# Patient Record
Sex: MALE | Race: WHITE | NOT HISPANIC OR LATINO | Employment: FULL TIME | URBAN - METROPOLITAN AREA
[De-identification: names, ages, dates, MRNs, and addresses within clinical notes are randomized per-mention and may not be internally consistent; named-entity substitution may affect disease eponyms.]

---

## 2020-03-05 ENCOUNTER — HOSPITAL ENCOUNTER (EMERGENCY)
Facility: HOSPITAL | Age: 40
Discharge: ANOTHER HOSPITAL | End: 2020-03-05
Attending: EMERGENCY MEDICINE

## 2020-03-05 VITALS
SYSTOLIC BLOOD PRESSURE: 152 MMHG | RESPIRATION RATE: 20 BRPM | HEART RATE: 75 BPM | OXYGEN SATURATION: 100 % | TEMPERATURE: 98 F | WEIGHT: 200.63 LBS | DIASTOLIC BLOOD PRESSURE: 86 MMHG

## 2020-03-05 DIAGNOSIS — L03.213 PERIORBITAL CELLULITIS OF LEFT EYE: Primary | ICD-10-CM

## 2020-03-05 DIAGNOSIS — H00.015 HORDEOLUM EXTERNUM LEFT LOWER EYELID: ICD-10-CM

## 2020-03-05 PROCEDURE — 99285 EMERGENCY DEPT VISIT HI MDM: CPT | Mod: ER

## 2020-03-05 RX ORDER — SULFAMETHOXAZOLE AND TRIMETHOPRIM 800; 160 MG/1; MG/1
1 TABLET ORAL
COMMUNITY

## 2020-03-05 RX ORDER — CEFADROXIL 500 MG/1
1 CAPSULE ORAL EVERY 12 HOURS
COMMUNITY

## 2020-03-05 NOTE — ED NOTES
Pt accepted by Dr. Patrick Martinez in the [facility].  Report called to Adrian BROWN at [facility] [address] in the ER.  Pt requesting to go private vehicle and declines ambulance transport.

## 2020-03-05 NOTE — ED PROVIDER NOTES
Encounter Date: 3/5/2020       History     Chief Complaint   Patient presents with    Eye Problem     redness/swelling. has seen 3 differents MDs, including opthamologist and states that he can't wait until next week for surgery. taking Bactrim and Cefadroxil     38 y/o M with no significant past medical history presents the emergency department with complaints of moderate, worsening, constant left lower eyelid swelling. This is been worsening over the past 1 week, and he did see an ophthalmologist yesterday and was diagnosed with a sebaceous cyst.  Patient says there is moderate constant pain that has improved with ibuprofen this morning.  Patient is currently taking Bactrim and a cephalosporin for this, and is scheduled for a procedure for removal next week.  Patient states that overnight the swelling did increase and started draining a yellow discharge and he started having surrounding redness and swelling as well that is worse.  He denies any fever, chills, vision loss, pain with range of motion of his eye, or other symptoms at this time.        Review of patient's allergies indicates:  No Known Allergies  History reviewed. No pertinent past medical history.  Past Surgical History:   Procedure Laterality Date    HERNIA REPAIR       History reviewed. No pertinent family history.  Social History     Tobacco Use    Smoking status: Never Smoker   Substance Use Topics    Alcohol use: Yes     Comment: social    Drug use: Never     Review of Systems   Constitutional: Negative for chills and fever.   HENT: Negative for congestion and dental problem.    Eyes: Negative for pain and visual disturbance.        Left lower eyelid sebaceous cyst with increased swelling   Respiratory: Negative for cough and shortness of breath.    Cardiovascular: Negative for chest pain and palpitations.   Gastrointestinal: Negative for abdominal pain, diarrhea, nausea and vomiting.   Genitourinary: Negative for dysuria and flank pain.    Musculoskeletal: Negative for back pain and neck pain.   Skin: Negative for rash and wound.   Neurological: Negative for weakness, numbness and headaches.       Physical Exam     Initial Vitals [03/05/20 0859]   BP Pulse Resp Temp SpO2   (!) 152/86 75 20 98.1 °F (36.7 °C) 100 %      MAP       --         Physical Exam    Constitutional: He appears well-developed and well-nourished. No distress.   HENT:   Head: Normocephalic and atraumatic.   Eyes: EOM are normal. Pupils are equal, round, and reactive to light.   There is a 2 cm left lower eyelid sebaceous cyst with some yellow crusting, and surrounding erythema and edema. There is left conjunctival inflamation as well.    Neck: Normal range of motion. Neck supple.   Cardiovascular: Normal rate and regular rhythm.   Pulmonary/Chest: Breath sounds normal. No respiratory distress.   Abdominal: He exhibits no distension. There is no tenderness.   Musculoskeletal: Normal range of motion. He exhibits no tenderness.   Neurological: He is alert and oriented to person, place, and time.   Skin: Skin is warm and dry.   Psychiatric: He has a normal mood and affect.             ED Course   Procedures  Labs Reviewed   HIV 1 / 2 ANTIBODY          Imaging Results    None                            ED Course as of Mar 05 1016   Thu Mar 05, 2020   4745 I spoke with the patient's ophthalmologist, Dr. Lozano's office () in [place]. The patient's wife has sent them a picture of the eye, and the patient's has a doctors appointment in 3 hours with them. Dr. Oro reviewed the immage and recommended transfer to facility with IV antibiotic capability, and that is closer than [place] to the patient proximity as there was concerns for preseptal cellulitis. I discussed this with the patient, and he would like to go to [facility]. Patient will wait for me to call [facility], but adamantly is refusing ambulance transportation, and therefore will not have IV started at this time.     [BA]    1013 All historical, clinical, radiographic, and laboratory findings were reviewed with the patient/family in detail along with the indications for transfer to an outside facility (rather than admission to our facility in [place]) secondary to our facility not having Ophthamology and a need for  emergent ophthamology evaluation and IV antibiotics given the diagnosis of periorbital cellulitis.  All remaining questions and concerns were addressed at that time and the patient/family communicates understanding and agrees to proceed accordingly.  Similarly all pertinent details of the [facility] to accept the patient in transfer based on the needs/patient preferences outlined above.  Patient will be transferred by POV per his preference.   Ritchie Carbajal MD  10:13 AM          [BA]      ED Course User Index  [BA] Ritchie Carbajal MD                Clinical Impression:       ICD-10-CM ICD-9-CM   1. Periorbital cellulitis of left eye L03.213 682.0   2. Hordeolum externum left lower eyelid H00.015 373.11             ED Disposition Condition    Transfer to Another Facility Stable                          Ritchie Carbajal MD  03/05/20 1016

## 2021-05-12 ENCOUNTER — HISTORICAL (OUTPATIENT)
Dept: ADMINISTRATIVE | Facility: HOSPITAL | Age: 41
End: 2021-05-12

## 2021-05-16 LAB — FINAL CULTURE: NORMAL
